# Patient Record
Sex: FEMALE | Race: WHITE | Employment: FULL TIME | URBAN - METROPOLITAN AREA
[De-identification: names, ages, dates, MRNs, and addresses within clinical notes are randomized per-mention and may not be internally consistent; named-entity substitution may affect disease eponyms.]

---

## 2017-05-22 ENCOUNTER — HOSPITAL ENCOUNTER (OUTPATIENT)
Dept: MAMMOGRAPHY | Age: 57
Discharge: HOME | End: 2017-05-22
Attending: OBSTETRICS & GYNECOLOGY
Payer: COMMERCIAL

## 2017-05-22 DIAGNOSIS — Z12.31 VISIT FOR SCREENING MAMMOGRAM: ICD-10-CM

## 2017-05-22 PROCEDURE — 77067 SCR MAMMO BI INCL CAD: CPT

## 2017-06-01 ENCOUNTER — HOSPITAL ENCOUNTER (OUTPATIENT)
Dept: MAMMOGRAPHY | Age: 57
Discharge: HOME | End: 2017-06-01
Attending: OBSTETRICS & GYNECOLOGY
Payer: COMMERCIAL

## 2017-06-01 DIAGNOSIS — R92.8 ABNORMAL SCREENING MAMMOGRAM: ICD-10-CM

## 2017-06-01 PROCEDURE — 76642 ULTRASOUND BREAST LIMITED: CPT

## 2017-06-01 PROCEDURE — 77065 DX MAMMO INCL CAD UNI: CPT

## 2017-06-06 ENCOUNTER — HOSPITAL ENCOUNTER (OUTPATIENT)
Dept: MAMMOGRAPHY | Age: 57
Discharge: HOME | End: 2017-06-06
Attending: OBSTETRICS & GYNECOLOGY
Payer: COMMERCIAL

## 2017-06-06 VITALS — HEART RATE: 94 BPM | SYSTOLIC BLOOD PRESSURE: 150 MMHG | TEMPERATURE: 97.9 F | DIASTOLIC BLOOD PRESSURE: 88 MMHG

## 2017-06-06 DIAGNOSIS — N63.10 BREAST MASS, RIGHT: ICD-10-CM

## 2017-06-06 DIAGNOSIS — N63.10 MASS OF RIGHT BREAST: ICD-10-CM

## 2017-06-06 PROCEDURE — 77065 DX MAMMO INCL CAD UNI: CPT

## 2017-06-06 PROCEDURE — 77030013267 US BX BREAST RT 1ST LESION W/CLIP AND SPECIMEN

## 2017-06-06 PROCEDURE — 74011000250 HC RX REV CODE- 250: Performed by: OBSTETRICS & GYNECOLOGY

## 2017-06-06 PROCEDURE — 88305 TISSUE EXAM BY PATHOLOGIST: CPT | Performed by: OBSTETRICS & GYNECOLOGY

## 2017-06-06 RX ORDER — LIDOCAINE HYDROCHLORIDE 10 MG/ML
6 INJECTION INFILTRATION; PERINEURAL
Status: COMPLETED | OUTPATIENT
Start: 2017-06-06 | End: 2017-06-06

## 2017-06-06 RX ADMIN — LIDOCAINE HYDROCHLORIDE 6 ML: 10 INJECTION, SOLUTION INFILTRATION; PERINEURAL at 11:29

## 2018-01-12 PROBLEM — F33.9 RECURRENT DEPRESSION (HCC): Status: ACTIVE | Noted: 2018-01-12

## 2018-07-11 ENCOUNTER — HOSPITAL ENCOUNTER (OUTPATIENT)
Dept: MAMMOGRAPHY | Age: 58
Discharge: HOME | End: 2018-07-11
Attending: OBSTETRICS & GYNECOLOGY
Payer: COMMERCIAL

## 2018-07-11 DIAGNOSIS — Z12.31 VISIT FOR SCREENING MAMMOGRAM: ICD-10-CM

## 2018-07-11 PROCEDURE — 77067 SCR MAMMO BI INCL CAD: CPT

## 2019-07-19 ENCOUNTER — HOSPITAL ENCOUNTER (OUTPATIENT)
Dept: MAMMOGRAPHY | Age: 59
Discharge: HOME | End: 2019-07-19
Attending: INTERNAL MEDICINE
Payer: COMMERCIAL

## 2019-07-19 ENCOUNTER — HOSPITAL ENCOUNTER (OUTPATIENT)
Dept: MAMMOGRAPHY | Age: 59
Discharge: HOME | End: 2019-07-19
Attending: OBSTETRICS & GYNECOLOGY
Payer: COMMERCIAL

## 2019-07-19 DIAGNOSIS — Z12.31 VISIT FOR SCREENING MAMMOGRAM: ICD-10-CM

## 2019-07-19 DIAGNOSIS — Z78.0 POSTMENOPAUSAL: ICD-10-CM

## 2019-07-19 PROCEDURE — 77080 DXA BONE DENSITY AXIAL: CPT

## 2019-07-19 PROCEDURE — 77067 SCR MAMMO BI INCL CAD: CPT

## 2020-07-20 ENCOUNTER — HOSPITAL ENCOUNTER (OUTPATIENT)
Dept: MAMMOGRAPHY | Age: 60
Discharge: HOME | End: 2020-07-20
Attending: OBSTETRICS & GYNECOLOGY

## 2020-07-20 DIAGNOSIS — Z12.31 VISIT FOR SCREENING MAMMOGRAM: ICD-10-CM

## 2020-07-20 NOTE — LETTER
[address]   [address] [facility] [address] [facility]  [place], [address]                   Date: 6/21/2021        Dear Ms. Alyx Miller good health is important to us; that is why we are sending you this friendly reminder. As always, our goal is to be your partner in life-long wellness. Our records indicate that you need to make an appointment for your yearly mammogram. The approximate due date of your exam is 7/21/2021. Please call [phone] or [phone] to schedule this examination at your earliest convenience. If you have had this study done elsewhere, please contact us so we may update our records. If you have recently scheduled your appointment, kindly disregard this reminder.       Sincerely,    [address]  for Riz[facility]

## 2020-07-27 NOTE — PROGRESS NOTES
Neg mammogram.  Dense breast tissue - could have a supplemental breast mri to help with this.  Can order if pt would like

## 2020-10-20 ENCOUNTER — HOSPITAL ENCOUNTER (OUTPATIENT)
Dept: GENERAL RADIOLOGY | Age: 60
Discharge: HOME | End: 2020-10-20
Attending: INTERNAL MEDICINE
Payer: COMMERCIAL

## 2020-10-20 DIAGNOSIS — M05.9 SEROPOSITIVE RHEUMATOID ARTHRITIS (HCC): ICD-10-CM

## 2020-10-20 PROCEDURE — 73130 X-RAY EXAM OF HAND: CPT

## 2020-10-20 PROCEDURE — 73630 X-RAY EXAM OF FOOT: CPT

## 2021-07-20 ENCOUNTER — HOSPITAL ENCOUNTER (OUTPATIENT)
Dept: MAMMOGRAPHY | Age: 61
Discharge: HOME | End: 2021-07-20
Attending: INTERNAL MEDICINE
Payer: COMMERCIAL

## 2021-07-20 DIAGNOSIS — M81.0 POSTMENOPAUSAL OSTEOPOROSIS: ICD-10-CM

## 2021-07-20 PROCEDURE — 77080 DXA BONE DENSITY AXIAL: CPT

## 2022-02-24 ENCOUNTER — TRANSCRIBE ORDER (OUTPATIENT)
Dept: SCHEDULING | Age: 62
End: 2022-02-24

## 2022-02-24 DIAGNOSIS — Z12.31 ENCOUNTER FOR SCREENING MAMMOGRAM FOR MALIGNANT NEOPLASM OF BREAST: Primary | ICD-10-CM

## 2022-03-18 PROBLEM — F33.9 RECURRENT DEPRESSION (HCC): Status: ACTIVE | Noted: 2018-01-12

## 2022-06-27 ENCOUNTER — OFFICE VISIT (OUTPATIENT)
Dept: RHEUMATOLOGY | Age: 62
End: 2022-06-27
Payer: COMMERCIAL

## 2022-06-27 VITALS
HEIGHT: 60 IN | BODY MASS INDEX: 27.21 KG/M2 | WEIGHT: 138.6 LBS | SYSTOLIC BLOOD PRESSURE: 118 MMHG | DIASTOLIC BLOOD PRESSURE: 72 MMHG | RESPIRATION RATE: 16 BRPM | HEART RATE: 88 BPM

## 2022-06-27 DIAGNOSIS — R74.01 ELEVATED ALT MEASUREMENT: ICD-10-CM

## 2022-06-27 DIAGNOSIS — M81.0 POSTMENOPAUSAL OSTEOPOROSIS: ICD-10-CM

## 2022-06-27 DIAGNOSIS — M05.9 SEROPOSITIVE RHEUMATOID ARTHRITIS (HCC): Primary | ICD-10-CM

## 2022-06-27 DIAGNOSIS — Z79.83 LONG TERM (CURRENT) USE OF BISPHOSPHONATES: ICD-10-CM

## 2022-06-27 DIAGNOSIS — Z79.899 LONG-TERM USE OF LEFLUNOMIDE THERAPY: ICD-10-CM

## 2022-06-27 PROCEDURE — 99214 OFFICE O/P EST MOD 30 MIN: CPT | Performed by: INTERNAL MEDICINE

## 2022-06-27 RX ORDER — LEFLUNOMIDE 20 MG/1
20 TABLET ORAL DAILY
Qty: 90 TABLET | Refills: 0 | Status: SHIPPED | OUTPATIENT
Start: 2022-06-27 | End: 2022-10-21 | Stop reason: SDUPTHER

## 2022-06-27 ASSESSMENT — PATIENT HEALTH QUESTIONNAIRE - PHQ9
SUM OF ALL RESPONSES TO PHQ QUESTIONS 1-9: 0
1. LITTLE INTEREST OR PLEASURE IN DOING THINGS: 0
SUM OF ALL RESPONSES TO PHQ QUESTIONS 1-9: 0
SUM OF ALL RESPONSES TO PHQ QUESTIONS 1-9: 0
2. FEELING DOWN, DEPRESSED OR HOPELESS: 0
SUM OF ALL RESPONSES TO PHQ QUESTIONS 1-9: 0
SUM OF ALL RESPONSES TO PHQ9 QUESTIONS 1 & 2: 0

## 2022-06-27 NOTE — PROGRESS NOTES
6/27/22      SUBJECTIVE:  Morenita Ramos is a 62 y.o. female that is here for follow-up of:     Seropositive -RF +CCP >250 Rheumatoid Arthritis -  Dx 2016   - Current therapies: enbrel weekly (started ~2017), arava 20 mg (started ~2017)   - TB/Hepatitis - Nov 2020  Failed therapies:   - MTX: hair loss   - HCQ    - SSZ : GI upset   - xeljanz: GI upset    osteoporosis- #1 reclast Sept 2021     Hysterectomy 1999  L shoulder: RC tear w/arthroscopy    Works at [employer]  ---  Last office visit: Feb 2022. pt was maintained on arava 20 mg. Dx with Covid Dec 2021 then developed secondary sinus infection requiring antibiotic. Completed 4 covid vaccines since last visit. Adherent to therapy    Am stiffness - 10-15 mins  No consistent joint pain. No joint swelling    Follows with hand surgery and might have L #4 DIP fused in the future: on hold for now though. Told she has severe OA in base of bilateral hands    R tennis elbow symptoms are resolved. Didn't go back to OT  Using vitamin D 5000U daily, calcium citrate. No planned dental procedures . Does not consume regular high calcium or vitamin D foods. No freq infections, N/V, stomach pain    The most recent, and if available, past bone density study details are as follows. Date L1-4 spine? BMD L1-4 spine T-score Femoral Neck BMD Femoral Neck T-score Lowest  T-score    7/2021        -2.5                                          REVIEW OF SYSTEMS:  A total of 10 systems (musculoskeletal system as stated in the HPI and the following 9 systems) were reviewed with patient today and were negative except as stated in the HPI and except for the following (depicted with an \"X\"):        \"X\" Constitutional  \"X\" HEENT /Mouth  \"X\" Cardiovascular and  Respiratory (2 systems)  \"X\" Gastrointestinal    Fever/chills   Hair loss   Shortness of breath   Upset stomach    Falls   Dry mouth   Coughing   Diarrhea / constipation    Wt loss   Mouth sores   Wheezing   Heartburn    Wt gain Ringing ears   Chest pain   Dark or bloody stools    Night sweats   Diff. swallowing  X None of above   Nausea or vomiting   X None of above  X None of above     X None of above                \"X\" Integumentary  \"X\" Neurological  \"X\" Genitourinary  \"X\" Psychiatric    Easy bruising   Numbness/ tingling   Female problems  x Depression    Rashes  x Weakness   Problems with urination  x Feeling anxious    Sun sensitivity   Headaches  X None of above   Problems sleeping   X None of above   None of above      None of above       The following portions of the patient's history were reviewed and updated as appropriate:   Current Outpatient Medications   Medication Sig Dispense Refill    leflunomide (ARAVA) 20 MG tablet Take 1 tablet by mouth daily 90 tablet 0    CALCIUM-MAGNESIUM-ZINC PO Take 1 tablet by mouth daily      acetaminophen (TYLENOL) 500 MG tablet Take 1,000 mg by mouth every 6 hours as needed      azelastine (ASTELIN) 0.1 % nasal spray USE 1 SPRAY IN BOTH NOSTRILS TWO TIMES A DAY      busPIRone (BUSPAR) 7.5 MG tablet 10 mg      cetirizine (ZYRTEC) 10 MG tablet Take by mouth      clonazePAM (KLONOPIN) 0.5 MG tablet Take 0.5 mg by mouth.  cyanocobalamin 1000 MCG tablet Take 2,500 mcg by mouth daily      ezetimibe (ZETIA) 10 MG tablet Zetia 10 mg tablet Take 1 tablet every day by oral route.  fexofenadine (ALLEGRA) 180 MG tablet Take 180 mg by mouth daily as needed      lisinopril (PRINIVIL;ZESTRIL) 5 MG tablet TAKE 1 TABLET BY MOUTH EVERY DAY      sertraline (ZOLOFT) 100 MG tablet Take 150 mg by mouth daily      zoledronic acid (RECLAST) 5 MG/100ML SOLN Infuse 5 mg intravenously once       No current facility-administered medications for this visit. PHYSICAL EXAM:  Vitals:    06/27/22 1418   BP: 118/72   Pulse: 88   Resp: 16         CONSTITUTIONAL:  The patient was in NAD.  ENT:  The OPC, MMM, lips/teeth/gums without abnormalities.   NECK:  No masses or thyromegaly  LYMPH NODES:  No cervical or axillary lymphadenopathy. CV:  RRR no r/m/g. Normal peripheral pulses  RESP:  CTA bilaterally. Normal respiratory effort. GI:  Abdomen soft, non tender, no hepatosplenomegaly  Skin:  No rashes, nodules, or other lesions. MUSCULOSKELETAL :  All joints including neck, back bilateral shoulders, elbows, wrists, MCP's, PIP's, DIP's, hips, knees, ankles, toes are within normal limits including normal gross examination, no synovitis, no tenderness, n'l ROM EXCEPT:   Heberden's nodules  R elbow - reduced extension  T: 1         CDAI  Date:  [facility] Pt-Global MD-Global Score:  6/2022  1 0 4.5  2  7.5 - arava  11/2021 9 0 5  4  18  8/2021  0 0 5.5  2  7.5  3/2021  0 0 4  3  7    Scoring:  Remission CDAI ? 2.8   Low Disease Activity CDAI > 2.8 and ? 10   Moderate Disease Activity CDAI > 10 and ? 22   High Disease Activity CDAI > 22   A CDAI reduction of 6.5 represents moderate improvement      ASSESSMENT AND PLAN:  Chester Davis is a 62 y.o. female that is here for evaluation of RA.  her problems include:    1. Seropositive rheumatoid arthritis ([address]  2. Cyclic citrullinated peptide (CCP) antibody positive  3. Admission for therapeutic drug monitoring  4. Elevated ALT-less than 2 times upper limit of normal  Currently better controlled since she has stopped working. continue monotherapy with leflunomide 20 mg. She will call if she develops change in joint pain, swelling, stiffness. She has an injection of Humira to initiate if needed. Osteoporosis  Vitamin D deficiency  - received reclast Sept 2021.   Plan for second dose Reclast September 2022.  -normal vitamin D Nov 2021  - cont vit D 5000U  -Continue calcium citrate 600 mg bid   - reviewed core exercises and fall precautions  -DEXA due July 2023    Labs early Aug 2022 - standing, vit D  Reclast due Sept 2022  Standing labs Nov 2022   OV Dec 2022      Sadie Cha MD  [facility]  [address] [place] [address] [address]  [phone]

## 2022-06-27 NOTE — Clinical Note
Can you get patient scheduled for next Reclast in September 2022 without an office visit?   She will have labs done in August.  Thank you

## 2022-08-15 ENCOUNTER — HOSPITAL ENCOUNTER (OUTPATIENT)
Dept: MAMMOGRAPHY | Age: 62
Discharge: HOME | End: 2022-08-18
Payer: COMMERCIAL

## 2022-08-15 DIAGNOSIS — Z12.31 SCREENING MAMMOGRAM FOR HIGH-RISK PATIENT: ICD-10-CM

## 2022-08-15 PROCEDURE — 77063 BREAST TOMOSYNTHESIS BI: CPT

## 2022-09-01 ENCOUNTER — TELEPHONE (OUTPATIENT)
Dept: RHEUMATOLOGY | Age: 62
End: 2022-09-01

## 2022-09-01 NOTE — TELEPHONE ENCOUNTER
PHONE CALL to patient. She called and cancelled her lab that was scheduled. She did just have labs on 8/19/22 with Dr. Reginald Gerber. LEFT MESSAGE on the  at Dr. Mike Myers office to get her labs. PA for Reclast approved online on [facility] 9/1/22- 9/1/23, auth # [id number]    Scheduled pt for Reclast 9/13/22.

## 2022-09-01 NOTE — TELEPHONE ENCOUNTER
----- Message from Todd Jiang MA sent at 6/27/2022  4:55 PM EDT -----  Regarding: Jhon Kay MD sent to Todd Jiang MA    Can you get patient scheduled for next Reclast in September 2022 without an office visit? Ace Sanders will have labs done in August. Earna Big you

## 2022-09-06 NOTE — TELEPHONE ENCOUNTER
Labs received from Dr. Thurston Police office.  Performed 8/25/22  Calcium= 9.6  Creatnine= 0.8  GFR= 74.5  Lab Results   Component Value Date/Time    VITD25 31.0 11/17/2021 10:49 AM      Reclast 9/13/22, last infusion was 9/10/21

## 2022-09-13 ENCOUNTER — NURSE ONLY (OUTPATIENT)
Dept: RHEUMATOLOGY | Age: 62
End: 2022-09-13
Payer: COMMERCIAL

## 2022-09-13 DIAGNOSIS — M81.0 POSTMENOPAUSAL OSTEOPOROSIS: Primary | ICD-10-CM

## 2022-09-13 PROCEDURE — 96365 THER/PROPH/DIAG IV INF INIT: CPT | Performed by: INTERNAL MEDICINE

## 2022-09-13 RX ORDER — ZOLEDRONIC ACID 5 MG/100ML
5 INJECTION, SOLUTION INTRAVENOUS ONCE
Status: COMPLETED | OUTPATIENT
Start: 2022-09-13 | End: 2022-09-13

## 2022-09-13 RX ADMIN — ZOLEDRONIC ACID 5 MG: 5 INJECTION, SOLUTION INTRAVENOUS at 14:50

## 2022-09-14 VITALS — TEMPERATURE: 97.3 F | HEART RATE: 90 BPM | SYSTOLIC BLOOD PRESSURE: 104 MMHG | DIASTOLIC BLOOD PRESSURE: 75 MMHG

## 2022-09-14 NOTE — PROGRESS NOTES
[address] [address], [address]  Office : [phone], Fax: [phone]       Reclast 5mg/100ml infused today over 30 minutes for safety. Infusion tolerated well without complications. Advised patient to continue taking Calcium and Vitamin D post infusion. Call with any problems or side effects. Infusion placed in left forearm    IV insertion time: 1445  Medication start time: 1450  Medication completion time: 5808    Pre-infusion/injection questionairre for osteoporosis    1. Have you had or are you planning any dental work? NO    2. Are you taking your calcium and vitamin D? YES    3. When was your last osteoporosis treatment? 9/10/21    4. Have you had any recent fractures?  NO

## 2022-10-20 DIAGNOSIS — M05.9 SEROPOSITIVE RHEUMATOID ARTHRITIS (HCC): ICD-10-CM

## 2022-10-20 NOTE — TELEPHONE ENCOUNTER
Dr. Montoya Mad patient. Patient is treated for rheumatoid arthritis. Requesting refill of [facility]. Last visit 6/27/22, most recent labs below. Copied from attached file in lab tab. CMP 8/25/22.   CBC 8/19/22

## 2022-10-21 RX ORDER — LEFLUNOMIDE 20 MG/1
20 TABLET ORAL DAILY
Qty: 90 TABLET | Refills: 0 | Status: SHIPPED | OUTPATIENT
Start: 2022-10-21

## 2022-11-07 ENCOUNTER — NURSE ONLY (OUTPATIENT)
Dept: RHEUMATOLOGY | Age: 62
End: 2022-11-07

## 2022-11-07 DIAGNOSIS — M05.9 SEROPOSITIVE RHEUMATOID ARTHRITIS (HCC): ICD-10-CM

## 2022-11-07 DIAGNOSIS — M81.0 POSTMENOPAUSAL OSTEOPOROSIS: ICD-10-CM

## 2022-11-07 LAB
25(OH)D3 SERPL-MCNC: 63.4 NG/ML (ref 30–100)
ALBUMIN SERPL-MCNC: 4.1 G/DL (ref 3.2–4.6)
ALBUMIN/GLOB SERPL: 1.4 (ref 0.4–1.6)
ALP SERPL-CCNC: 56 U/L (ref 50–136)
ALT SERPL-CCNC: 32 U/L (ref 12–65)
ANION GAP SERPL CALC-SCNC: 3 MMOL/L (ref 2–11)
AST SERPL-CCNC: 17 U/L (ref 15–37)
BASOPHILS # BLD: 0.1 K/UL (ref 0–0.2)
BASOPHILS NFR BLD: 1 % (ref 0–2)
BILIRUB SERPL-MCNC: 0.4 MG/DL (ref 0.2–1.1)
BUN SERPL-MCNC: 20 MG/DL (ref 8–23)
CALCIUM SERPL-MCNC: 9.5 MG/DL (ref 8.3–10.4)
CHLORIDE SERPL-SCNC: 106 MMOL/L (ref 101–110)
CO2 SERPL-SCNC: 28 MMOL/L (ref 21–32)
CREAT SERPL-MCNC: 0.8 MG/DL (ref 0.6–1)
CRP SERPL-MCNC: <0.3 MG/DL (ref 0–0.9)
DIFFERENTIAL METHOD BLD: NORMAL
EOSINOPHIL # BLD: 0.3 K/UL (ref 0–0.8)
EOSINOPHIL NFR BLD: 5 % (ref 0.5–7.8)
ERYTHROCYTE [DISTWIDTH] IN BLOOD BY AUTOMATED COUNT: 13.1 % (ref 11.9–14.6)
ERYTHROCYTE [SEDIMENTATION RATE] IN BLOOD: 13 MM/HR (ref 0–30)
GLOBULIN SER CALC-MCNC: 3 G/DL (ref 2.8–4.5)
GLUCOSE SERPL-MCNC: 103 MG/DL (ref 65–100)
HCT VFR BLD AUTO: 43.3 % (ref 35.8–46.3)
HGB BLD-MCNC: 13.8 G/DL (ref 11.7–15.4)
IMM GRANULOCYTES # BLD AUTO: 0 K/UL (ref 0–0.5)
IMM GRANULOCYTES NFR BLD AUTO: 0 % (ref 0–5)
LYMPHOCYTES # BLD: 1.7 K/UL (ref 0.5–4.6)
LYMPHOCYTES NFR BLD: 23 % (ref 13–44)
MCH RBC QN AUTO: 29.3 PG (ref 26.1–32.9)
MCHC RBC AUTO-ENTMCNC: 31.9 G/DL (ref 31.4–35)
MCV RBC AUTO: 91.9 FL (ref 82–102)
MONOCYTES # BLD: 0.7 K/UL (ref 0.1–1.3)
MONOCYTES NFR BLD: 9 % (ref 4–12)
NEUTS SEG # BLD: 4.5 K/UL (ref 1.7–8.2)
NEUTS SEG NFR BLD: 62 % (ref 43–78)
NRBC # BLD: 0 K/UL (ref 0–0.2)
PLATELET # BLD AUTO: 265 K/UL (ref 150–450)
PMV BLD AUTO: 10.2 FL (ref 9.4–12.3)
POTASSIUM SERPL-SCNC: 4.5 MMOL/L (ref 3.5–5.1)
PROT SERPL-MCNC: 7.1 G/DL (ref 6.3–8.2)
RBC # BLD AUTO: 4.71 M/UL (ref 4.05–5.2)
SODIUM SERPL-SCNC: 137 MMOL/L (ref 133–143)
WBC # BLD AUTO: 7.3 K/UL (ref 4.3–11.1)

## 2023-01-24 DIAGNOSIS — M05.9 SEROPOSITIVE RHEUMATOID ARTHRITIS (HCC): ICD-10-CM

## 2023-01-24 NOTE — TELEPHONE ENCOUNTER
Pt needs refill of arava prior to upcoming visit on 1/31/23  Labs done 11/7/22.     Pended refill below

## 2023-01-25 RX ORDER — LEFLUNOMIDE 20 MG/1
20 TABLET ORAL DAILY
Qty: 90 TABLET | Refills: 0 | Status: SHIPPED | OUTPATIENT
Start: 2023-01-25

## 2023-01-31 ENCOUNTER — OFFICE VISIT (OUTPATIENT)
Dept: RHEUMATOLOGY | Age: 63
End: 2023-01-31
Payer: COMMERCIAL

## 2023-01-31 VITALS
DIASTOLIC BLOOD PRESSURE: 78 MMHG | RESPIRATION RATE: 14 BRPM | HEART RATE: 112 BPM | HEIGHT: 60 IN | SYSTOLIC BLOOD PRESSURE: 132 MMHG | BODY MASS INDEX: 27.07 KG/M2

## 2023-01-31 DIAGNOSIS — M05.9 SEROPOSITIVE RHEUMATOID ARTHRITIS (HCC): ICD-10-CM

## 2023-01-31 LAB
ALBUMIN SERPL-MCNC: 4.3 G/DL (ref 3.2–4.6)
ALBUMIN/GLOB SERPL: 1.3 (ref 0.4–1.6)
ALP SERPL-CCNC: 60 U/L (ref 50–136)
ALT SERPL-CCNC: 30 U/L (ref 12–65)
ANION GAP SERPL CALC-SCNC: 11 MMOL/L (ref 2–11)
AST SERPL-CCNC: 21 U/L (ref 15–37)
BASOPHILS # BLD: 0 K/UL (ref 0–0.2)
BASOPHILS NFR BLD: 1 % (ref 0–2)
BILIRUB SERPL-MCNC: 0.6 MG/DL (ref 0.2–1.1)
BUN SERPL-MCNC: 19 MG/DL (ref 8–23)
CALCIUM SERPL-MCNC: 9.6 MG/DL (ref 8.3–10.4)
CHLORIDE SERPL-SCNC: 104 MMOL/L (ref 101–110)
CO2 SERPL-SCNC: 27 MMOL/L (ref 21–32)
CREAT SERPL-MCNC: 0.9 MG/DL (ref 0.6–1)
CRP SERPL-MCNC: <0.3 MG/DL (ref 0–0.9)
DIFFERENTIAL METHOD BLD: NORMAL
EOSINOPHIL # BLD: 0.3 K/UL (ref 0–0.8)
EOSINOPHIL NFR BLD: 4 % (ref 0.5–7.8)
ERYTHROCYTE [DISTWIDTH] IN BLOOD BY AUTOMATED COUNT: 13.2 % (ref 11.9–14.6)
ERYTHROCYTE [SEDIMENTATION RATE] IN BLOOD: 10 MM/HR (ref 0–30)
GLOBULIN SER CALC-MCNC: 3.2 G/DL (ref 2.8–4.5)
GLUCOSE SERPL-MCNC: 124 MG/DL (ref 65–100)
HCT VFR BLD AUTO: 45.1 % (ref 35.8–46.3)
HGB BLD-MCNC: 14.4 G/DL (ref 11.7–15.4)
IMM GRANULOCYTES # BLD AUTO: 0 K/UL (ref 0–0.5)
IMM GRANULOCYTES NFR BLD AUTO: 0 % (ref 0–5)
LYMPHOCYTES # BLD: 1.7 K/UL (ref 0.5–4.6)
LYMPHOCYTES NFR BLD: 21 % (ref 13–44)
MCH RBC QN AUTO: 29.7 PG (ref 26.1–32.9)
MCHC RBC AUTO-ENTMCNC: 31.9 G/DL (ref 31.4–35)
MCV RBC AUTO: 93 FL (ref 82–102)
MONOCYTES # BLD: 0.4 K/UL (ref 0.1–1.3)
MONOCYTES NFR BLD: 5 % (ref 4–12)
NEUTS SEG # BLD: 5.7 K/UL (ref 1.7–8.2)
NEUTS SEG NFR BLD: 69 % (ref 43–78)
NRBC # BLD: 0 K/UL (ref 0–0.2)
PLATELET # BLD AUTO: 289 K/UL (ref 150–450)
PMV BLD AUTO: 9.7 FL (ref 9.4–12.3)
POTASSIUM SERPL-SCNC: 4.2 MMOL/L (ref 3.5–5.1)
PROT SERPL-MCNC: 7.5 G/DL (ref 6.3–8.2)
RBC # BLD AUTO: 4.85 M/UL (ref 4.05–5.2)
SODIUM SERPL-SCNC: 142 MMOL/L (ref 133–143)
WBC # BLD AUTO: 8.3 K/UL (ref 4.3–11.1)

## 2023-01-31 PROCEDURE — 99214 OFFICE O/P EST MOD 30 MIN: CPT | Performed by: INTERNAL MEDICINE

## 2023-01-31 RX ORDER — LEFLUNOMIDE 20 MG/1
20 TABLET ORAL DAILY
Qty: 90 TABLET | Refills: 0 | Status: SHIPPED | OUTPATIENT
Start: 2023-01-31

## 2023-01-31 ASSESSMENT — ROUTINE ASSESSMENT OF PATIENT INDEX DATA (RAPID3)
ON A SCALE OF ONE TO TEN, HOW DIFFICULT WAS IT FOR YOU TO COMPLETE THE LISTED DAILY PHYSICAL TASKS OVER THE LAST WEEK: 0.2
WHEN YOU AWAKENED IN THE MORNING OVER THE LAST WEEK, PLEASE INDICATE THE AMOUNT OF TIME IT TAKES UNTIL YOU ARE AS LIMBER AS YOU WILL BE FOR THE DAY: < 10 MIN
ON A SCALE OF ONE TO TEN, CONSIDERING ALL THE WAYS IN WHICH ILLNESS AND HEALTH CONDITIONS MAY AFFECT YOU AT THIS TIME, PLEASE INDICATE BELOW HOW YOU ARE DOING:: 5
ON A SCALE OF ONE TO TEN, HOW MUCH PAIN HAVE YOU HAD BECAUSE OF YOUR CONDITION OVER THE PAST WEEK?: 5
ON A SCALE OF ONE TO TEN, HOW MUCH OF A PROBLEM HAS UNUSUAL FATIGUE OR TIREDNESS BEEN FOR YOU OVER THE PAST WEEK?: 4

## 2023-01-31 NOTE — PROGRESS NOTES
1/31/23      SUBJECTIVE:  Kylie Fernández is a 62 y.o. female that is here for follow-up of:     Seropositive -RF +CCP >250 Rheumatoid Arthritis -  Dx 2016   - Current therapies: enbrel weekly (started ~2017), arava 20 mg (started ~2017)   - TB/Hepatitis - Nov 2020  Failed therapies:   - MTX: hair loss   - HCQ - retinal toxicity   - SSZ : GI upset   - xeljanz: GI upset    osteoporosis- #1 reclast Sept 2021     Hysterectomy 1999  L shoulder: RC tear w/arthroscopy    Prev worked  at [employer]  ---  Last office visit: June 2022. pt was maintained on arava 20 mg. Adherent to therapy- felt this was under control. Labs reviewed 11/7/22-CBC with differential, CMP, ESR, CRP-normal. Dx with Covid Dec 2021 then developed secondary sinus infection requiring antibiotic. Planned initiation of humira- she briefly used then held related to    Completed new covid booster, flu shot  Adherent to therapy    Am stiffness - 10-15 mins  Lately- experiencing new hand pain x 1 mo - midday in PIPs - increasing towards end of the day. Swelling in L #2 PIP, L #2 MCP - episodic. No joint swelling    Follows with hand surgery and might have L #4 DIP fused in the future: on hold for now though. Told she has severe OA in base of bilateral hands      Using vitamin D 5000U daily, calcium citrate. No planned dental procedures . Does not consume regular high calcium or vitamin D foods. No freq infections, N/V, stomach pain    The most recent, and if available, past bone density study details are as follows. Date L1-4 spine? BMD L1-4 spine T-score Femoral Neck BMD Femoral Neck T-score Lowest  T-score    7/2021        -2.5                                      DMARD/Biologic 1/31/2023   AM Stiffness < 10 min   Pain 5   Fatigue 4   MDHAQ 0.2   Patient Global Score 5   Medication Name Arava   Dose 20mg QD           REVIEW OF SYSTEMS:  A total of 10 systems (musculoskeletal system as stated in the HPI and the following 9 systems) were reviewed with patient today and were negative except as stated in the HPI and except for the following (depicted with an \"X\"):        \"X\" Constitutional  \"X\" HEENT /Mouth  \"X\" Cardiovascular and  Respiratory (2 systems)  \"X\" Gastrointestinal    Fever/chills   Hair loss   Shortness of breath  x Upset stomach    Falls   Dry mouth   Coughing  x Diarrhea / constipation    Wt loss   Mouth sores   Wheezing   Heartburn    Wt gain   Ringing ears   Chest pain   Dark or bloody stools    Night sweats   Diff. swallowing  X None of above   Nausea or vomiting   X None of above  X None of above      None of above                \"X\" Integumentary  \"X\" Neurological  \"X\" Genitourinary  \"X\" Psychiatric    Easy bruising   Numbness/ tingling   Female problems   Depression    Rashes   Weakness   Problems with urination  x Feeling anxious    Sun sensitivity  x Headaches  X None of above   Problems sleeping   X None of above   None of above      None of above       The following portions of the patient's history were reviewed and updated as appropriate:   Current Outpatient Medications   Medication Sig Dispense Refill    leflunomide (ARAVA) 20 MG tablet Take 1 tablet by mouth daily 90 tablet 0    CALCIUM-MAGNESIUM-ZINC PO Take 1 tablet by mouth daily      acetaminophen (TYLENOL) 500 MG tablet Take 1,000 mg by mouth every 6 hours as needed      azelastine (ASTELIN) 0.1 % nasal spray USE 1 SPRAY IN BOTH NOSTRILS TWO TIMES A DAY      busPIRone (BUSPAR) 7.5 MG tablet 10 mg      cetirizine (ZYRTEC) 10 MG tablet Take by mouth      clonazePAM (KLONOPIN) 0.5 MG tablet Take 0.5 mg by mouth nightly as needed. cyanocobalamin 1000 MCG tablet Take 2,500 mcg by mouth daily      ezetimibe (ZETIA) 10 MG tablet Zetia 10 mg tablet Take 1 tablet every day by oral route.       fexofenadine (ALLEGRA) 180 MG tablet Take 180 mg by mouth daily as needed      lisinopril (PRINIVIL;ZESTRIL) 5 MG tablet TAKE 1 TABLET BY MOUTH EVERY DAY      sertraline (ZOLOFT) 100 MG tablet Take 150 mg by mouth daily      zoledronic acid (RECLAST) 5 MG/100ML SOLN Infuse 5 mg intravenously once       No current facility-administered medications for this visit. PHYSICAL EXAM:  Vitals:    01/31/23 1241   BP: 132/78   Pulse: (!) 112   Resp: 14         CONSTITUTIONAL:  The patient was in NAD.  ENT:  The OPC, MMM, lips/teeth/gums without abnormalities. NECK:  No masses or thyromegaly  LYMPH NODES:  No cervical or axillary lymphadenopathy. CV:  RRR no r/m/g. Normal peripheral pulses  RESP:  CTA bilaterally. Normal respiratory effort. GI:  Abdomen soft, non tender, no hepatosplenomegaly  Skin:  No rashes, nodules, or other lesions. MUSCULOSKELETAL :  All joints including neck, back bilateral shoulders, elbows, wrists, MCP's, PIP's, DIP's, hips, knees, ankles, toes are within normal limits including normal gross examination, no synovitis, no tenderness, n'l ROM EXCEPT:   Heberden's nodules  R elbow - reduced extension  T: 11        CDAI  Date:  [facility] Pt-Global MD-Global Score:  1/2023  11 0 5  5  21  6/2022  1 0 4.5  2  7.5 - arava  11/2021 9 0 5  4  18  8/2021  0 0 5.5  2  7.5  3/2021  0 0 4  3  7    Scoring:  Remission CDAI ? 2.8   Low Disease Activity CDAI > 2.8 and ? 10   Moderate Disease Activity CDAI > 10 and ? 22   High Disease Activity CDAI > 22   A CDAI reduction of 6.5 represents moderate improvement      ASSESSMENT AND PLAN:  Anyi Parikh is a 62 y.o. female that is here for evaluation of RA.  her problems include:    1. Seropositive rheumatoid arthritis ([address]  2. Cyclic citrullinated peptide (CCP) antibody positive  3.  Admission for therapeutic drug monitoring    Poor control at this time-moderate disease activity on leflunomide 20 Mg.  - Check ESR, CRP, Vectra-if elevated will discuss Humira initiation (patient has 1 injection at home)  - If labs are normal, patient will contact me should symptoms worsen for which we would initiate Humira  Discussed the medication Humira dosage, usage, goals of therapy, and potential side effects. Discussed potential side effects including but not limited to increased prevalence/incidence of neoplasms, lymphoma, neurologic disease, infection risk including the possibility of life threatening infections, and exacerbation of CHF with anti-TNF use. The patient understood and wishes to proceed with the treatment.           Osteoporosis  Vitamin D deficiency  - received reclast Sept 2021, sept 2022 (#2).    -normal vitamin D Nov 2021  - cont vit D 5000U  -Continue calcium citrate 600 mg bid   - reviewed core exercises and fall precautions  -DEXA due July 2023    Labs today: Standing, Vectra  Follow-up in 3 months with standing labs    Claudette Ocampo MD  [facility]  [address] [place] [address] [address]  [phone]

## 2023-02-07 LAB
BIOMARKER COMMENT: NORMAL
CRP RESULT: 0.51 MG/L
EGF RESULT: 82 PG/ML
FOOTNOTE/HISTORY: NORMAL
IL-6 RESULT: 16 PG/ML
LEPTIN RESULT: 73 NG/ML
Lab: NORMAL
Lab: NORMAL
MMP-1 RESULT: 3.8 NG/ML
MMP3 RESULT: 18 NG/ML
RESISTIN RESULT: 4.1 NG/ML
RISK OF RADIOGRAPHIC PROGRESS: 3 %
SAA RESULT: 1.1 UG/ML
TNF-RI RESULT: 1.2 NG/ML
VCAM-1 RESULT: 0.89 UG/ML
VECTRA SCORE: 28
VECTRA(R) LEVEL: NORMAL
VEGF-A RESULT: 400 PG/ML
YKL-40 RESULT: 30 NG/ML

## 2023-03-23 ENCOUNTER — TELEPHONE (OUTPATIENT)
Dept: RHEUMATOLOGY | Age: 63
End: 2023-03-23

## 2023-03-23 NOTE — TELEPHONE ENCOUNTER
Pt lvm that she needed a refill on arava and the pharmacy told her she did not have one. Elijah Lawler was sent on 1/31/23 and picked up as a 90 day supply [facility]. Call to patient and got vm, left message for pt that she should have sufficient medication and would not have a refill past 90 day supply until labs were done and she would also have her follow up visit as well. Requested patient call back if she had any questions.

## 2023-04-24 DIAGNOSIS — M05.9 SEROPOSITIVE RHEUMATOID ARTHRITIS (HCC): ICD-10-CM

## 2023-04-24 LAB
BASOPHILS # BLD: 0.1 K/UL (ref 0–0.2)
BASOPHILS NFR BLD: 1 % (ref 0–2)
DIFFERENTIAL METHOD BLD: NORMAL
EOSINOPHIL # BLD: 0.2 K/UL (ref 0–0.8)
EOSINOPHIL NFR BLD: 3 % (ref 0.5–7.8)
ERYTHROCYTE [DISTWIDTH] IN BLOOD BY AUTOMATED COUNT: 13.3 % (ref 11.9–14.6)
ERYTHROCYTE [SEDIMENTATION RATE] IN BLOOD: 1 MM/HR (ref 0–30)
HCT VFR BLD AUTO: 43.5 % (ref 35.8–46.3)
HGB BLD-MCNC: 13.7 G/DL (ref 11.7–15.4)
IMM GRANULOCYTES # BLD AUTO: 0 K/UL (ref 0–0.5)
IMM GRANULOCYTES NFR BLD AUTO: 0 % (ref 0–5)
LYMPHOCYTES # BLD: 1.2 K/UL (ref 0.5–4.6)
LYMPHOCYTES NFR BLD: 22 % (ref 13–44)
MCH RBC QN AUTO: 29.2 PG (ref 26.1–32.9)
MCHC RBC AUTO-ENTMCNC: 31.5 G/DL (ref 31.4–35)
MCV RBC AUTO: 92.8 FL (ref 82–102)
MONOCYTES # BLD: 0.4 K/UL (ref 0.1–1.3)
MONOCYTES NFR BLD: 7 % (ref 4–12)
NEUTS SEG # BLD: 3.7 K/UL (ref 1.7–8.2)
NEUTS SEG NFR BLD: 67 % (ref 43–78)
NRBC # BLD: 0 K/UL (ref 0–0.2)
PLATELET # BLD AUTO: 251 K/UL (ref 150–450)
PMV BLD AUTO: 10.1 FL (ref 9.4–12.3)
RBC # BLD AUTO: 4.69 M/UL (ref 4.05–5.2)
WBC # BLD AUTO: 5.5 K/UL (ref 4.3–11.1)

## 2023-04-25 LAB
ALBUMIN SERPL-MCNC: 4.2 G/DL (ref 3.2–4.6)
ALBUMIN/GLOB SERPL: 1.7 (ref 0.4–1.6)
ALP SERPL-CCNC: 63 U/L (ref 50–136)
ALT SERPL-CCNC: 32 U/L (ref 12–65)
ANION GAP SERPL CALC-SCNC: 4 MMOL/L (ref 2–11)
AST SERPL-CCNC: 20 U/L (ref 15–37)
BILIRUB SERPL-MCNC: 0.5 MG/DL (ref 0.2–1.1)
BUN SERPL-MCNC: 16 MG/DL (ref 8–23)
CALCIUM SERPL-MCNC: 9.4 MG/DL (ref 8.3–10.4)
CHLORIDE SERPL-SCNC: 106 MMOL/L (ref 101–110)
CO2 SERPL-SCNC: 27 MMOL/L (ref 21–32)
CREAT SERPL-MCNC: 0.7 MG/DL (ref 0.6–1)
CRP SERPL-MCNC: <0.3 MG/DL (ref 0–0.9)
GLOBULIN SER CALC-MCNC: 2.5 G/DL (ref 2.8–4.5)
GLUCOSE SERPL-MCNC: 128 MG/DL (ref 65–100)
POTASSIUM SERPL-SCNC: 4.4 MMOL/L (ref 3.5–5.1)
PROT SERPL-MCNC: 6.7 G/DL (ref 6.3–8.2)
SODIUM SERPL-SCNC: 137 MMOL/L (ref 133–143)

## 2023-05-08 ENCOUNTER — OFFICE VISIT (OUTPATIENT)
Dept: RHEUMATOLOGY | Age: 63
End: 2023-05-08
Payer: COMMERCIAL

## 2023-05-08 VITALS
BODY MASS INDEX: 25.91 KG/M2 | WEIGHT: 132 LBS | HEART RATE: 100 BPM | RESPIRATION RATE: 14 BRPM | DIASTOLIC BLOOD PRESSURE: 78 MMHG | SYSTOLIC BLOOD PRESSURE: 128 MMHG | HEIGHT: 60 IN

## 2023-05-08 DIAGNOSIS — M81.0 POSTMENOPAUSAL OSTEOPOROSIS: Primary | ICD-10-CM

## 2023-05-08 DIAGNOSIS — M05.9 SEROPOSITIVE RHEUMATOID ARTHRITIS (HCC): ICD-10-CM

## 2023-05-08 PROCEDURE — 99214 OFFICE O/P EST MOD 30 MIN: CPT | Performed by: INTERNAL MEDICINE

## 2023-05-08 RX ORDER — ATORVASTATIN CALCIUM 40 MG/1
40 TABLET, FILM COATED ORAL DAILY
COMMUNITY

## 2023-05-08 RX ORDER — LEFLUNOMIDE 20 MG/1
20 TABLET ORAL DAILY
Qty: 90 TABLET | Refills: 0 | Status: SHIPPED | OUTPATIENT
Start: 2023-05-08

## 2023-05-08 ASSESSMENT — ROUTINE ASSESSMENT OF PATIENT INDEX DATA (RAPID3)
ON A SCALE OF ONE TO TEN, HOW MUCH PAIN HAVE YOU HAD BECAUSE OF YOUR CONDITION OVER THE PAST WEEK?: 4
ON A SCALE OF ONE TO TEN, HOW MUCH OF A PROBLEM HAS UNUSUAL FATIGUE OR TIREDNESS BEEN FOR YOU OVER THE PAST WEEK?: 6
ON A SCALE OF ONE TO TEN, CONSIDERING ALL THE WAYS IN WHICH ILLNESS AND HEALTH CONDITIONS MAY AFFECT YOU AT THIS TIME, PLEASE INDICATE BELOW HOW YOU ARE DOING:: 4
ON A SCALE OF ONE TO TEN, HOW DIFFICULT WAS IT FOR YOU TO COMPLETE THE LISTED DAILY PHYSICAL TASKS OVER THE LAST WEEK: 0.1
WHEN YOU AWAKENED IN THE MORNING OVER THE LAST WEEK, PLEASE INDICATE THE AMOUNT OF TIME IT TAKES UNTIL YOU ARE AS LIMBER AS YOU WILL BE FOR THE DAY: < 10 MIN

## 2023-05-08 NOTE — PROGRESS NOTES
5/8/23      SUBJECTIVE:  Noemi Reyes is a 62 y.o. female that is here for follow-up of:     Seropositive -RF +CCP >250 Rheumatoid Arthritis -  Dx 2016   - Current therapies: , arava 20 mg (started ~2017)   - TB/Hepatitis - Nov 2020   - enbrel   - humira  Failed therapies:   - MTX: hair loss   - HCQ - retinal toxicity   - SSZ : GI upset   - xeljanz: GI upset    osteoporosis- #1 reclast Sept 2021. 2022     Hysterectomy 1999  L shoulder: RC tear w/arthroscopy    Prev worked  at [employer] - retired  ---  Last office visit: Jan 2023. pt was maintained on arava 20 mg. Feels better today than last visit. Adherent to therapy. Father is in home hospice so she has been staying with her parents. She doesn't eat regular meals or sleep. Experienced significant epigastric pain when he first got sick that is better now - she was very stressed. Required tagamet - didn't want to eat at that time. Has lost about 3 pounds    Labs reviewed 4/24/23-CBC with differential, CMP, ESR, CRP      Completed new covid booster, flu shot  Adherent to therapy    Am stiffness - 10 mins  No consistent joint pain or swelling      Follows with hand surgery and might have L #4 DIP fused in the future: on hold for now though. Told she has severe OA in base of bilateral hands      Using vitamin D 5000U daily, calcium citrate. No planned dental procedures . Does not consume regular high calcium or vitamin D foods. No freq infections, N/V, stomach pain    The most recent, and if available, past bone density study details are as follows. Date L1-4 spine? BMD L1-4 spine T-score Femoral Neck BMD Femoral Neck T-score Lowest  T-score    7/2021        -2.5                                      DMARD/Biologic 5/8/2023   AM Stiffness < 10 min   Pain 4   Fatigue 6   MDHAQ 0.1   Patient Global Score 4   Medication Name Arava   Dose 20mg           REVIEW OF SYSTEMS:  A total of 10 systems (musculoskeletal system as stated in the HPI and the following 9 systems)

## 2023-05-08 NOTE — PATIENT INSTRUCTIONS
- about 6 months after last omicron booster you can get 2nd dose.      - prevnar 20 - recommended pneumonia vaccine     to scheduled bone density    Return in 3 months with LABS ONE WEEK PRIOR TO APPT

## 2023-07-27 ENCOUNTER — HOSPITAL ENCOUNTER (OUTPATIENT)
Dept: MAMMOGRAPHY | Age: 63
Discharge: HOME | End: 2023-07-27
Attending: INTERNAL MEDICINE
Payer: COMMERCIAL

## 2023-07-27 DIAGNOSIS — M81.0 POSTMENOPAUSAL OSTEOPOROSIS: ICD-10-CM

## 2023-07-27 DIAGNOSIS — M05.9 SEROPOSITIVE RHEUMATOID ARTHRITIS (HCC): ICD-10-CM

## 2023-07-27 LAB
BASOPHILS # BLD: 0.1 K/UL (ref 0–0.2)
BASOPHILS NFR BLD: 1 % (ref 0–2)
DIFFERENTIAL METHOD BLD: NORMAL
EOSINOPHIL # BLD: 0.3 K/UL (ref 0–0.8)
EOSINOPHIL NFR BLD: 6 % (ref 0.5–7.8)
ERYTHROCYTE [DISTWIDTH] IN BLOOD BY AUTOMATED COUNT: 13.2 % (ref 11.9–14.6)
ERYTHROCYTE [SEDIMENTATION RATE] IN BLOOD: 17 MM/HR (ref 0–30)
HCT VFR BLD AUTO: 43.4 % (ref 35.8–46.3)
HGB BLD-MCNC: 13.9 G/DL (ref 11.7–15.4)
IMM GRANULOCYTES # BLD AUTO: 0 K/UL (ref 0–0.5)
IMM GRANULOCYTES NFR BLD AUTO: 0 % (ref 0–5)
LYMPHOCYTES # BLD: 1.4 K/UL (ref 0.5–4.6)
LYMPHOCYTES NFR BLD: 30 % (ref 13–44)
MCH RBC QN AUTO: 29.4 PG (ref 26.1–32.9)
MCHC RBC AUTO-ENTMCNC: 32 G/DL (ref 31.4–35)
MCV RBC AUTO: 91.8 FL (ref 82–102)
MONOCYTES # BLD: 0.4 K/UL (ref 0.1–1.3)
MONOCYTES NFR BLD: 10 % (ref 4–12)
NEUTS SEG # BLD: 2.5 K/UL (ref 1.7–8.2)
NEUTS SEG NFR BLD: 53 % (ref 43–78)
NRBC # BLD: 0 K/UL (ref 0–0.2)
PLATELET # BLD AUTO: 276 K/UL (ref 150–450)
PMV BLD AUTO: 10 FL (ref 9.4–12.3)
RBC # BLD AUTO: 4.73 M/UL (ref 4.05–5.2)
WBC # BLD AUTO: 4.7 K/UL (ref 4.3–11.1)

## 2023-07-27 PROCEDURE — 77080 DXA BONE DENSITY AXIAL: CPT

## 2023-07-28 LAB
ALBUMIN SERPL-MCNC: 4.1 G/DL (ref 3.2–4.6)
ALBUMIN/GLOB SERPL: 1.5 (ref 0.4–1.6)
ALP SERPL-CCNC: 66 U/L (ref 50–136)
ALT SERPL-CCNC: 32 U/L (ref 12–65)
ANION GAP SERPL CALC-SCNC: 10 MMOL/L (ref 2–11)
AST SERPL-CCNC: 20 U/L (ref 15–37)
BILIRUB SERPL-MCNC: 0.4 MG/DL (ref 0.2–1.1)
BUN SERPL-MCNC: 18 MG/DL (ref 8–23)
CALCIUM SERPL-MCNC: 9 MG/DL (ref 8.3–10.4)
CHLORIDE SERPL-SCNC: 109 MMOL/L (ref 101–110)
CO2 SERPL-SCNC: 24 MMOL/L (ref 21–32)
CREAT SERPL-MCNC: 0.8 MG/DL (ref 0.6–1)
CRP SERPL-MCNC: <0.3 MG/DL (ref 0–0.9)
GLOBULIN SER CALC-MCNC: 2.8 G/DL (ref 2.8–4.5)
GLUCOSE SERPL-MCNC: 92 MG/DL (ref 65–100)
POTASSIUM SERPL-SCNC: 4.2 MMOL/L (ref 3.5–5.1)
PROT SERPL-MCNC: 6.9 G/DL (ref 6.3–8.2)
SODIUM SERPL-SCNC: 143 MMOL/L (ref 133–143)

## 2023-08-08 ENCOUNTER — OFFICE VISIT (OUTPATIENT)
Dept: RHEUMATOLOGY | Age: 63
End: 2023-08-08
Payer: COMMERCIAL

## 2023-08-08 VITALS
BODY MASS INDEX: 26.9 KG/M2 | WEIGHT: 137 LBS | RESPIRATION RATE: 16 BRPM | DIASTOLIC BLOOD PRESSURE: 92 MMHG | HEART RATE: 96 BPM | SYSTOLIC BLOOD PRESSURE: 128 MMHG | HEIGHT: 60 IN

## 2023-08-08 DIAGNOSIS — M81.0 POSTMENOPAUSAL OSTEOPOROSIS: ICD-10-CM

## 2023-08-08 DIAGNOSIS — M05.9 SEROPOSITIVE RHEUMATOID ARTHRITIS (HCC): Primary | ICD-10-CM

## 2023-08-08 DIAGNOSIS — Z79.899 LONG-TERM USE OF LEFLUNOMIDE THERAPY: ICD-10-CM

## 2023-08-08 DIAGNOSIS — M15.4 EROSIVE OSTEOARTHRITIS OF BOTH HANDS: ICD-10-CM

## 2023-08-08 DIAGNOSIS — Z79.83 LONG TERM (CURRENT) USE OF BISPHOSPHONATES: ICD-10-CM

## 2023-08-08 PROCEDURE — 99214 OFFICE O/P EST MOD 30 MIN: CPT | Performed by: INTERNAL MEDICINE

## 2023-08-08 RX ORDER — DICLOFENAC SODIUM 30 MG/G
1 GEL TOPICAL 2 TIMES DAILY PRN
Qty: 100 G | Refills: 1 | Status: SHIPPED | OUTPATIENT
Start: 2023-08-08

## 2023-08-08 RX ORDER — LEFLUNOMIDE 20 MG/1
20 TABLET ORAL DAILY
Qty: 90 TABLET | Refills: 0 | Status: SHIPPED | OUTPATIENT
Start: 2023-08-08

## 2023-08-08 ASSESSMENT — ROUTINE ASSESSMENT OF PATIENT INDEX DATA (RAPID3)
ON A SCALE OF ONE TO TEN, HOW DIFFICULT WAS IT FOR YOU TO COMPLETE THE LISTED DAILY PHYSICAL TASKS OVER THE LAST WEEK: 0.1
ON A SCALE OF ONE TO TEN, HOW MUCH OF A PROBLEM HAS UNUSUAL FATIGUE OR TIREDNESS BEEN FOR YOU OVER THE PAST WEEK?: 5
ON A SCALE OF ONE TO TEN, HOW MUCH PAIN HAVE YOU HAD BECAUSE OF YOUR CONDITION OVER THE PAST WEEK?: 5
WHEN YOU AWAKENED IN THE MORNING OVER THE LAST WEEK, PLEASE INDICATE THE AMOUNT OF TIME IT TAKES UNTIL YOU ARE AS LIMBER AS YOU WILL BE FOR THE DAY: < 10 MIN
ON A SCALE OF ONE TO TEN, CONSIDERING ALL THE WAYS IN WHICH ILLNESS AND HEALTH CONDITIONS MAY AFFECT YOU AT THIS TIME, PLEASE INDICATE BELOW HOW YOU ARE DOING:: 4

## 2023-08-08 NOTE — PROGRESS NOTES
8/8/23      SUBJECTIVE:  Brandi Uribe is a 62 y.o. female that is here for follow-up of:     Seropositive -RF +CCP >250 Rheumatoid Arthritis -  Dx 2016   - Current therapies: , arava 20 mg (started ~2017)   - TB/Hepatitis - Nov 2020   - enbrel   - humira  Failed therapies:   - MTX: hair loss   - HCQ - retinal toxicity   - SSZ : GI upset   - xeljanz: GI upset    osteoporosis- #1 reclast Sept 2021. 2022     Hysterectomy 1999  L shoulder: RC tear w/arthroscopy    Prev worked  at [employer] - retired  ---  Last office visit: May 2023. pt was maintained on arava 20 mg. Father passed since last visit. She is not staying with her mom now - nephew is. Birth of grandson yesterday Henna Smith and she is excited to meet him after our appt. Feels better today than last visit. Adherent to therapy. Father is in home hospice so she has been staying with her parents. She doesn't eat regular meals or sleep. Experienced significant epigastric pain when he first got sick that is better now - she was very stressed. Required tagamet - didn't want to eat at that time. Has lost about 3 pounds    Labs reviewed 4/24/23-CBC with differential, CMP, ESR, CRP      Completed new covid booster, flu shot  Adherent to therapy    Am stiffness - < 10 mins  No consistent joint pain or swelling outside of more pain in DIPs -she is using hands more. Follows with hand surgery and might have L #4 DIP fused in the future: on hold for now though. Told she has severe OA in base of bilateral hands      Using vitamin D 5000U daily, calcium citrate. No planned dental procedures. Consuming at least 1 serving of non-dairy milk on daily basis. Does not consume regular high calcium or vitamin D foods. No falls or fractures. No freq infections, N/V, stomach pain    The most recent, and if available, past bone density study details are as follows. Date L1-4 spine? BMD L1-4 spine T-score Femoral Neck BMD Femoral Neck T-score Lowest  T-score           -2.5

## 2023-08-08 NOTE — PATIENT INSTRUCTIONS
Return in 3 months  Do labs one week prior to visit      Paraffin wax  Diclofenac gel 1% four times daily  Over the counter options - lidocaine capsaicin menthol    Fish oil  Turmeric - hold until seeing pcp for heartburn  High fiber diet

## 2023-08-10 ENCOUNTER — TELEPHONE (OUTPATIENT)
Dept: RHEUMATOLOGY | Age: 63
End: 2023-08-10

## 2023-08-10 DIAGNOSIS — M15.4 EROSIVE OSTEOARTHRITIS OF BOTH HANDS: Primary | ICD-10-CM

## 2023-08-28 RX ORDER — DICLOFENAC SODIUM 20 MG/G
SOLUTION TOPICAL
Qty: 112 G | Refills: 2 | Status: SHIPPED | OUTPATIENT
Start: 2023-08-28

## 2023-09-06 NOTE — TELEPHONE ENCOUNTER
Additional information requested for PA for Diclofenac 2%. Faxed form with last OV and OV from Dr. Dario Cobb showing meds tried and failed.

## 2023-09-06 NOTE — TELEPHONE ENCOUNTER
Diclofenac 2% has been denied- she has tried and failed the alternatives, so the denial must be because the FDA approved use is for OA of the knees   (Nonprescription (over-the-counter) diclofenac topical gel (Voltaren Arthritis Pain) is used to relieve pain from arthritis in certain joints such as those of the knees, ankles, feet, elbows, wrists, and hands. Prescription diclofenac topical solution (Pennsaid) is used to relieve osteoarthritis pain in the knees. Message on [facility]:  Denied today  Request Reference Number: [id number]. DICLOFENAC SOL 2% is denied for not meeting the prior authorization requirement(s). Details of this decision are in the notice attached below or have been faxed to you. The requested medication is not covered because it is not on the listing or formulary of approved  drugs for your plan benefit. Please discuss alternative drug therapy with your provider. Per your health plan's criteria, this drug is covered if you meet the following:  (1) Both of the following:  (A) Your doctor provides medical records (for example: chart notes) showing the drug is being used for a Food and Drug Administration (FDA)-approved indication. (B) Additional requirements listed have been met (requirements in the Indications and Usage sections of the prescribing information or package insert, for example: first line therapies have been tried and failed, required testing have been met). The information provided does not show that you meet the criteria listed above. Please speak with your doctor about your choices.

## 2023-09-15 ENCOUNTER — TELEPHONE (OUTPATIENT)
Dept: RHEUMATOLOGY | Age: 63
End: 2023-09-15

## 2023-09-15 NOTE — TELEPHONE ENCOUNTER
Reclast due 9/13/23. PA not needed for 2023 for Reclast.   Per [employer]- patient's benefits:  INDIVIDUAL DEDUCTIBLE: $3,000.00 PER SERVICE YEAR - $2,643.14 REMAINING  INDIVIDUAL OUT OF POCKET: $6,600.00 PER SERVICE YEAR - $2,579.01 REMAINING  INDIVIDUAL COINSURANCE: 35%    PHONE CALL to patient to schedule the Reclast. Scheduled her for 9/20/23. Advised to come well hydrated and premed with 2 Tylenol.

## 2023-09-15 NOTE — TELEPHONE ENCOUNTER
Advised patient efforts to get higher strength of Diclofenac gel failed. She received the letter as well. Informed her she could continue to use tot OTC options as needed. Pt understood.

## 2023-09-20 ENCOUNTER — NURSE ONLY (OUTPATIENT)
Dept: RHEUMATOLOGY | Age: 63
End: 2023-09-20
Payer: COMMERCIAL

## 2023-09-20 VITALS — DIASTOLIC BLOOD PRESSURE: 81 MMHG | TEMPERATURE: 98.2 F | SYSTOLIC BLOOD PRESSURE: 122 MMHG | HEART RATE: 96 BPM

## 2023-09-20 DIAGNOSIS — M81.0 POSTMENOPAUSAL OSTEOPOROSIS: Primary | ICD-10-CM

## 2023-09-20 PROCEDURE — 96365 THER/PROPH/DIAG IV INF INIT: CPT | Performed by: INTERNAL MEDICINE

## 2023-09-20 RX ORDER — ZOLEDRONIC ACID 5 MG/100ML
5 INJECTION, SOLUTION INTRAVENOUS ONCE
Status: COMPLETED | OUTPATIENT
Start: 2023-09-20 | End: 2023-09-20

## 2023-09-20 RX ADMIN — ZOLEDRONIC ACID 5 MG: 5 INJECTION, SOLUTION INTRAVENOUS at 14:10

## 2023-09-20 NOTE — PROGRESS NOTES
Shakila, [address], [address]   Office : [phone], Fax: [phone]       Reclast 5mg/100ml infused today over 30 minutes for safety. Infusion tolerated well without complications. Advised patient to continue taking Calcium and Vitamin D post infusion. Call with any problems or side effects. Infusion placed in left forearm. IV insertion time: 1405  Medication start time: 1410  Medication completion time: 1440    Patient discharged feeling fine and instructed to call the office with any post-infusion issues. Pre-infusion/injection questionairre for osteoporosis    1. Have you had or are you planning any dental work? NO    2. Are you taking your calcium and vitamin D? YES    3. When was your last osteoporosis treatment? 9/13/22    4. Have you had any recent fractures?  NO

## 2023-10-03 ENCOUNTER — HOSPITAL ENCOUNTER (OUTPATIENT)
Dept: MAMMOGRAPHY | Age: 63
Discharge: HOME | End: 2023-10-06
Attending: FAMILY MEDICINE
Payer: COMMERCIAL

## 2023-10-03 DIAGNOSIS — Z12.31 SCREENING MAMMOGRAM FOR HIGH-RISK PATIENT: ICD-10-CM

## 2023-10-03 PROCEDURE — 77063 BREAST TOMOSYNTHESIS BI: CPT

## 2023-10-30 DIAGNOSIS — M81.0 POSTMENOPAUSAL OSTEOPOROSIS: ICD-10-CM

## 2023-10-30 DIAGNOSIS — M05.9 SEROPOSITIVE RHEUMATOID ARTHRITIS (HCC): ICD-10-CM

## 2023-10-30 LAB
BASOPHILS # BLD: 0.1 K/UL (ref 0–0.2)
BASOPHILS NFR BLD: 1 % (ref 0–2)
DIFFERENTIAL METHOD BLD: NORMAL
EOSINOPHIL # BLD: 0.3 K/UL (ref 0–0.8)
EOSINOPHIL NFR BLD: 4 % (ref 0.5–7.8)
ERYTHROCYTE [DISTWIDTH] IN BLOOD BY AUTOMATED COUNT: 13.2 % (ref 11.9–14.6)
ERYTHROCYTE [SEDIMENTATION RATE] IN BLOOD: 9 MM/HR (ref 0–30)
HCT VFR BLD AUTO: 42.9 % (ref 35.8–46.3)
HGB BLD-MCNC: 13.5 G/DL (ref 11.7–15.4)
IMM GRANULOCYTES # BLD AUTO: 0 K/UL (ref 0–0.5)
IMM GRANULOCYTES NFR BLD AUTO: 0 % (ref 0–5)
LYMPHOCYTES # BLD: 1.6 K/UL (ref 0.5–4.6)
LYMPHOCYTES NFR BLD: 24 % (ref 13–44)
MCH RBC QN AUTO: 29 PG (ref 26.1–32.9)
MCHC RBC AUTO-ENTMCNC: 31.5 G/DL (ref 31.4–35)
MCV RBC AUTO: 92.3 FL (ref 82–102)
MONOCYTES # BLD: 0.8 K/UL (ref 0.1–1.3)
MONOCYTES NFR BLD: 12 % (ref 4–12)
NEUTS SEG # BLD: 3.9 K/UL (ref 1.7–8.2)
NEUTS SEG NFR BLD: 59 % (ref 43–78)
NRBC # BLD: 0 K/UL (ref 0–0.2)
PLATELET # BLD AUTO: 263 K/UL (ref 150–450)
PMV BLD AUTO: 10.1 FL (ref 9.4–12.3)
RBC # BLD AUTO: 4.65 M/UL (ref 4.05–5.2)
WBC # BLD AUTO: 6.6 K/UL (ref 4.3–11.1)

## 2023-10-31 LAB
25(OH)D3 SERPL-MCNC: 49.2 NG/ML (ref 30–100)
ALBUMIN SERPL-MCNC: 4.1 G/DL (ref 3.2–4.6)
ALBUMIN/GLOB SERPL: 1.2 (ref 0.4–1.6)
ALP SERPL-CCNC: 70 U/L (ref 50–136)
ALT SERPL-CCNC: 55 U/L (ref 12–65)
ANION GAP SERPL CALC-SCNC: 6 MMOL/L (ref 2–11)
AST SERPL-CCNC: 31 U/L (ref 15–37)
BILIRUB SERPL-MCNC: 0.4 MG/DL (ref 0.2–1.1)
BUN SERPL-MCNC: 20 MG/DL (ref 8–23)
CALCIUM SERPL-MCNC: 9.2 MG/DL (ref 8.3–10.4)
CHLORIDE SERPL-SCNC: 107 MMOL/L (ref 101–110)
CO2 SERPL-SCNC: 27 MMOL/L (ref 21–32)
CREAT SERPL-MCNC: 0.8 MG/DL (ref 0.6–1)
CRP SERPL-MCNC: <0.3 MG/DL (ref 0–0.9)
GLOBULIN SER CALC-MCNC: 3.3 G/DL (ref 2.8–4.5)
GLUCOSE SERPL-MCNC: 91 MG/DL (ref 65–100)
POTASSIUM SERPL-SCNC: 3.9 MMOL/L (ref 3.5–5.1)
PROT SERPL-MCNC: 7.4 G/DL (ref 6.3–8.2)
SODIUM SERPL-SCNC: 140 MMOL/L (ref 133–143)

## 2023-11-15 ENCOUNTER — OFFICE VISIT (OUTPATIENT)
Dept: RHEUMATOLOGY | Age: 63
End: 2023-11-15
Payer: COMMERCIAL

## 2023-11-15 VITALS
DIASTOLIC BLOOD PRESSURE: 84 MMHG | WEIGHT: 141 LBS | SYSTOLIC BLOOD PRESSURE: 138 MMHG | RESPIRATION RATE: 18 BRPM | HEIGHT: 60 IN | BODY MASS INDEX: 27.68 KG/M2 | HEART RATE: 84 BPM

## 2023-11-15 DIAGNOSIS — Z23 ENCOUNTER FOR IMMUNIZATION: ICD-10-CM

## 2023-11-15 DIAGNOSIS — M15.4 EROSIVE OSTEOARTHRITIS OF BOTH HANDS: ICD-10-CM

## 2023-11-15 DIAGNOSIS — Z79.899 LONG-TERM USE OF LEFLUNOMIDE THERAPY: Primary | ICD-10-CM

## 2023-11-15 DIAGNOSIS — M81.0 POSTMENOPAUSAL OSTEOPOROSIS: ICD-10-CM

## 2023-11-15 DIAGNOSIS — M05.9 SEROPOSITIVE RHEUMATOID ARTHRITIS (HCC): ICD-10-CM

## 2023-11-15 PROCEDURE — 99214 OFFICE O/P EST MOD 30 MIN: CPT | Performed by: INTERNAL MEDICINE

## 2023-11-15 RX ORDER — CHLORAL HYDRATE 500 MG
CAPSULE ORAL 2 TIMES DAILY
COMMUNITY

## 2023-11-15 RX ORDER — LEFLUNOMIDE 20 MG/1
20 TABLET ORAL DAILY
Qty: 90 TABLET | Refills: 0 | Status: SHIPPED | OUTPATIENT
Start: 2023-11-15

## 2023-11-15 ASSESSMENT — ROUTINE ASSESSMENT OF PATIENT INDEX DATA (RAPID3)
ON A SCALE OF ONE TO TEN, HOW MUCH OF A PROBLEM HAS UNUSUAL FATIGUE OR TIREDNESS BEEN FOR YOU OVER THE PAST WEEK?: 5
ON A SCALE OF ONE TO TEN, HOW MUCH PAIN HAVE YOU HAD BECAUSE OF YOUR CONDITION OVER THE PAST WEEK?: 5
WHEN YOU AWAKENED IN THE MORNING OVER THE LAST WEEK, PLEASE INDICATE THE AMOUNT OF TIME IT TAKES UNTIL YOU ARE AS LIMBER AS YOU WILL BE FOR THE DAY: < 10 MIN
ON A SCALE OF ONE TO TEN, HOW DIFFICULT WAS IT FOR YOU TO COMPLETE THE LISTED DAILY PHYSICAL TASKS OVER THE LAST WEEK: 0.1
ON A SCALE OF ONE TO TEN, CONSIDERING ALL THE WAYS IN WHICH ILLNESS AND HEALTH CONDITIONS MAY AFFECT YOU AT THIS TIME, PLEASE INDICATE BELOW HOW YOU ARE DOING:: 4

## 2023-11-15 NOTE — PROGRESS NOTES
(KLONOPIN) 0.5 MG tablet Take 1 tablet by mouth nightly as needed. fexofenadine (ALLEGRA) 180 MG tablet Take 1 tablet by mouth daily as needed      lisinopril (PRINIVIL;ZESTRIL) 5 MG tablet TAKE 1 TABLET BY MOUTH EVERY DAY      sertraline (ZOLOFT) 100 MG tablet Take 1 tablet by mouth daily      zoledronic acid (RECLAST) 5 MG/100ML SOLN Infuse 100 mLs intravenously once       No current facility-administered medications for this visit. PHYSICAL EXAM:  Vitals:    11/15/23 1059   BP: 138/84   Pulse: 84   Resp: 18         CONSTITUTIONAL:  The patient was in NAD.  ENT:  The OPC, MMM, lips/teeth/gums without abnormalities. NECK:  No masses or thyromegaly  CV:  RRR no r/m/g. Normal peripheral pulses  RESP:  CTA bilaterally. Normal respiratory effort. GI:  Abdomen soft, non tender, no hepatosplenomegaly  Skin:  No rashes, nodules, or other lesions. MUSCULOSKELETAL :  All joints including neck, back bilateral shoulders, elbows, wrists, MCP's, PIP's, DIP's, hips, knees, ankles, toes are within normal limits including normal gross examination, no synovitis, no tenderness, n'l ROM EXCEPT:   Prominent Heberden's nodules in bilateral #3 in particular but present within all  R elbow - reduced extension  T: 3  S: 0        CDAI  Date:  [facility] Pt-Global MD-Global Score:  11/2023 3 0 4.5  3  8/2023  2 0 4.5  3  5/2023  3 1 4.5  3  1/2023  11 0 5  5  21  6/2022  1 0 4.5  2  7.5 - arava  11/2021 9 0 5  4  18  8/2021  0 0 5.5  2  7.5  3/2021  0 0 4  3  7    Scoring:  Remission CDAI ? 2.8   Low Disease Activity CDAI > 2.8 and ? 10   Moderate Disease Activity CDAI > 10 and ? 22   High Disease Activity CDAI > 22   A CDAI reduction of 6.5 represents moderate improvement      ASSESSMENT AND PLAN:  Hector Campos is a 62 y.o. female that is here for evaluation of RA.  her problems include:    1. Seropositive rheumatoid arthritis ([address])  2. Cyclic citrullinated peptide (CCP) antibody positive  3.  Admission for therapeutic

## 2024-01-29 DIAGNOSIS — M05.9 SEROPOSITIVE RHEUMATOID ARTHRITIS (HCC): ICD-10-CM

## 2024-01-29 LAB
ALBUMIN SERPL-MCNC: 4 G/DL (ref 3.2–4.6)
ALBUMIN/GLOB SERPL: 1.1 (ref 0.4–1.6)
ALP SERPL-CCNC: 63 U/L (ref 50–136)
ALT SERPL-CCNC: 40 U/L (ref 12–65)
ANION GAP SERPL CALC-SCNC: 3 MMOL/L (ref 2–11)
AST SERPL-CCNC: 20 U/L (ref 15–37)
BASOPHILS # BLD: 0.1 K/UL (ref 0–0.2)
BASOPHILS NFR BLD: 1 % (ref 0–2)
BILIRUB SERPL-MCNC: 0.6 MG/DL (ref 0.2–1.1)
BUN SERPL-MCNC: 20 MG/DL (ref 8–23)
CALCIUM SERPL-MCNC: 9.1 MG/DL (ref 8.3–10.4)
CHLORIDE SERPL-SCNC: 109 MMOL/L (ref 103–113)
CO2 SERPL-SCNC: 26 MMOL/L (ref 21–32)
CREAT SERPL-MCNC: 0.9 MG/DL (ref 0.6–1)
CRP SERPL-MCNC: <0.3 MG/DL (ref 0–0.9)
DIFFERENTIAL METHOD BLD: NORMAL
EOSINOPHIL # BLD: 0.4 K/UL (ref 0–0.8)
EOSINOPHIL NFR BLD: 6 % (ref 0.5–7.8)
ERYTHROCYTE [DISTWIDTH] IN BLOOD BY AUTOMATED COUNT: 13.3 % (ref 11.9–14.6)
ERYTHROCYTE [SEDIMENTATION RATE] IN BLOOD: 12 MM/HR (ref 0–30)
GLOBULIN SER CALC-MCNC: 3.5 G/DL (ref 2.8–4.5)
GLUCOSE SERPL-MCNC: 102 MG/DL (ref 65–100)
HCT VFR BLD AUTO: 43.9 % (ref 35.8–46.3)
HGB BLD-MCNC: 14 G/DL (ref 11.7–15.4)
IMM GRANULOCYTES # BLD AUTO: 0 K/UL (ref 0–0.5)
IMM GRANULOCYTES NFR BLD AUTO: 0 % (ref 0–5)
LYMPHOCYTES # BLD: 2 K/UL (ref 0.5–4.6)
LYMPHOCYTES NFR BLD: 29 % (ref 13–44)
MCH RBC QN AUTO: 29.5 PG (ref 26.1–32.9)
MCHC RBC AUTO-ENTMCNC: 31.9 G/DL (ref 31.4–35)
MCV RBC AUTO: 92.4 FL (ref 82–102)
MONOCYTES # BLD: 0.6 K/UL (ref 0.1–1.3)
MONOCYTES NFR BLD: 9 % (ref 4–12)
NEUTS SEG # BLD: 3.8 K/UL (ref 1.7–8.2)
NEUTS SEG NFR BLD: 55 % (ref 43–78)
NRBC # BLD: 0 K/UL (ref 0–0.2)
PLATELET # BLD AUTO: 275 K/UL (ref 150–450)
PMV BLD AUTO: 10 FL (ref 9.4–12.3)
POTASSIUM SERPL-SCNC: 4 MMOL/L (ref 3.5–5.1)
PROT SERPL-MCNC: 7.5 G/DL (ref 6.3–8.2)
RBC # BLD AUTO: 4.75 M/UL (ref 4.05–5.2)
SODIUM SERPL-SCNC: 138 MMOL/L (ref 136–146)
WBC # BLD AUTO: 6.8 K/UL (ref 4.3–11.1)

## 2024-01-30 DIAGNOSIS — M05.9 SEROPOSITIVE RHEUMATOID ARTHRITIS (HCC): ICD-10-CM

## 2024-01-30 NOTE — TELEPHONE ENCOUNTER
Errol pt, OV 11/15/23 and appt 4/3/24. 1/29/24 labs. Rx for Arava 20 mg pended.     Component      Latest Ref Rng 1/29/2024   WBC      4.3 - 11.1 K/uL 6.8    RBC      4.05 - 5.2 M/uL 4.75    Hemoglobin Quant      11.7 - 15.4 g/dL 14.0    Hematocrit      35.8 - 46.3 % 43.9    MCV      82 - 102 FL 92.4    MCH      26.1 - 32.9 PG 29.5    MCHC      31.4 - 35.0 g/dL 31.9    RDW      11.9 - 14.6 % 13.3    Platelet Count      150 - 450 K/uL 275    MPV      9.4 - 12.3 FL 10.0    Nucleated Red Blood Cells      0.0 - 0.2 K/uL 0.00    Differential Type        AUTOMATED    Neutrophils %      43 - 78 % 55    Lymphocyte %      13 - 44 % 29    Monocytes %      4.0 - 12.0 % 9    Eosinophils %      0.5 - 7.8 % 6    Basophils %      0.0 - 2.0 % 1    Immature Granulocytes      0.0 - 5.0 % 0    Neutrophils Absolute      1.7 - 8.2 K/UL 3.8    Lymphocytes Absolute      0.5 - 4.6 K/UL 2.0    Monocytes Absolute      0.1 - 1.3 K/UL 0.6    Eosinophils Absolute      0.0 - 0.8 K/UL 0.4    Basophils Absolute      0.0 - 0.2 K/UL 0.1    Absolute Immature Granulocyte      0.0 - 0.5 K/UL 0.0    Sodium      136 - 146 mmol/L 138    Potassium      3.5 - 5.1 mmol/L 4.0    Chloride      103 - 113 mmol/L 109    CO2      21 - 32 mmol/L 26    Anion Gap      2 - 11 mmol/L 3    Glucose, Random      65 - 100 mg/dL 102 (H)    BUN,BUNPL      8 - 23 MG/DL 20    Creatinine      0.6 - 1.0 MG/DL 0.90    Est, Glom Filt Rate      >60 ml/min/1.73m2 >60    CALCIUM, SERUM, [id number]      8.3 - 10.4 MG/DL 9.1    BILIRUBIN TOTAL      0.2 - 1.1 MG/DL 0.6    ALT      12 - 65 U/L 40    AST      15 - 37 U/L 20    Alk Phos      50 - 136 U/L 63    Total Protein      6.3 - 8.2 g/dL 7.5    Albumin      3.2 - 4.6 g/dL 4.0    Globulin      2.8 - 4.5 g/dL 3.5    ALBUMIN/GLOBULIN RATIO      0.4 - 1.6   1.1    Sed Rate, Automated      0 - 30 mm/hr 12    CRP      0.0 - 0.9 mg/dL <0.3       Legend:  (H) High

## 2024-01-31 RX ORDER — LEFLUNOMIDE 20 MG/1
20 TABLET ORAL DAILY
Qty: 90 TABLET | Refills: 0 | Status: SHIPPED | OUTPATIENT
Start: 2024-01-31

## 2024-04-03 ENCOUNTER — OFFICE VISIT (OUTPATIENT)
Dept: RHEUMATOLOGY | Age: 64
End: 2024-04-03
Payer: COMMERCIAL

## 2024-04-03 VITALS
WEIGHT: 143 LBS | HEART RATE: 80 BPM | BODY MASS INDEX: 28.07 KG/M2 | HEIGHT: 60 IN | DIASTOLIC BLOOD PRESSURE: 78 MMHG | SYSTOLIC BLOOD PRESSURE: 122 MMHG | RESPIRATION RATE: 16 BRPM

## 2024-04-03 DIAGNOSIS — Z79.83 LONG TERM (CURRENT) USE OF BISPHOSPHONATES: ICD-10-CM

## 2024-04-03 DIAGNOSIS — M05.9 SEROPOSITIVE RHEUMATOID ARTHRITIS (HCC): ICD-10-CM

## 2024-04-03 DIAGNOSIS — M81.0 POSTMENOPAUSAL OSTEOPOROSIS: Primary | ICD-10-CM

## 2024-04-03 DIAGNOSIS — M81.0 POSTMENOPAUSAL OSTEOPOROSIS: ICD-10-CM

## 2024-04-03 DIAGNOSIS — Z79.899 LONG-TERM USE OF LEFLUNOMIDE THERAPY: ICD-10-CM

## 2024-04-03 LAB
BASOPHILS # BLD: 0.1 K/UL (ref 0–0.2)
BASOPHILS NFR BLD: 1 % (ref 0–2)
DIFFERENTIAL METHOD BLD: NORMAL
EOSINOPHIL # BLD: 0.3 K/UL (ref 0–0.8)
EOSINOPHIL NFR BLD: 4 % (ref 0.5–7.8)
ERYTHROCYTE [DISTWIDTH] IN BLOOD BY AUTOMATED COUNT: 13.4 % (ref 11.9–14.6)
ERYTHROCYTE [SEDIMENTATION RATE] IN BLOOD: 4 MM/HR (ref 0–30)
HCT VFR BLD AUTO: 45.1 % (ref 35.8–46.3)
HGB BLD-MCNC: 14.3 G/DL (ref 11.7–15.4)
IMM GRANULOCYTES # BLD AUTO: 0 K/UL (ref 0–0.5)
IMM GRANULOCYTES NFR BLD AUTO: 0 % (ref 0–5)
LYMPHOCYTES # BLD: 1.8 K/UL (ref 0.5–4.6)
LYMPHOCYTES NFR BLD: 23 % (ref 13–44)
MCH RBC QN AUTO: 29.3 PG (ref 26.1–32.9)
MCHC RBC AUTO-ENTMCNC: 31.7 G/DL (ref 31.4–35)
MCV RBC AUTO: 92.4 FL (ref 82–102)
MONOCYTES # BLD: 0.7 K/UL (ref 0.1–1.3)
MONOCYTES NFR BLD: 9 % (ref 4–12)
NEUTS SEG # BLD: 4.8 K/UL (ref 1.7–8.2)
NEUTS SEG NFR BLD: 63 % (ref 43–78)
NRBC # BLD: 0 K/UL (ref 0–0.2)
PLATELET # BLD AUTO: 282 K/UL (ref 150–450)
PMV BLD AUTO: 9.9 FL (ref 9.4–12.3)
RBC # BLD AUTO: 4.88 M/UL (ref 4.05–5.2)
WBC # BLD AUTO: 7.7 K/UL (ref 4.3–11.1)

## 2024-04-03 PROCEDURE — 99214 OFFICE O/P EST MOD 30 MIN: CPT | Performed by: INTERNAL MEDICINE

## 2024-04-03 RX ORDER — LEFLUNOMIDE 20 MG/1
20 TABLET ORAL DAILY
Qty: 90 TABLET | Refills: 0 | Status: SHIPPED | OUTPATIENT
Start: 2024-04-03

## 2024-04-03 ASSESSMENT — ROUTINE ASSESSMENT OF PATIENT INDEX DATA (RAPID3)
WHEN YOU AWAKENED IN THE MORNING OVER THE LAST WEEK, PLEASE INDICATE THE AMOUNT OF TIME IT TAKES UNTIL YOU ARE AS LIMBER AS YOU WILL BE FOR THE DAY: < 10 MIN
ON A SCALE OF ONE TO TEN, HOW DIFFICULT WAS IT FOR YOU TO COMPLETE THE LISTED DAILY PHYSICAL TASKS OVER THE LAST WEEK: 0.2
ON A SCALE OF ONE TO TEN, CONSIDERING ALL THE WAYS IN WHICH ILLNESS AND HEALTH CONDITIONS MAY AFFECT YOU AT THIS TIME, PLEASE INDICATE BELOW HOW YOU ARE DOING:: 4
ON A SCALE OF ONE TO TEN, HOW MUCH OF A PROBLEM HAS UNUSUAL FATIGUE OR TIREDNESS BEEN FOR YOU OVER THE PAST WEEK?: 5
ON A SCALE OF ONE TO TEN, HOW MUCH PAIN HAVE YOU HAD BECAUSE OF YOUR CONDITION OVER THE PAST WEEK?: 4

## 2024-04-03 NOTE — PROGRESS NOTES
4/3/24      SUBJECTIVE:  Ayana Tan is a 61 y.o. female that is here for follow-up of:     Seropositive -RF +CCP >250 Rheumatoid Arthritis -  Dx 2016   - Current therapies: , arava 20 mg (started ~2017)   - TB/Hepatitis - Nov 2020   - enbrel   - humira  Failed therapies:   - MTX: hair loss   - HCQ - retinal toxicity   - SSZ : GI upset   - xeljanz: GI upset    osteoporosis- #1 reclast Sept 2021. 2022, 2023     Hysterectomy 1999  L shoulder: RC tear w/arthroscopy    Prev worked  at [employer] - retired  ---  Last office visit: August 2023 with labs 1-.  Received Reclast infusion September 2023 #3 health side effects pt was maintained on arava 20 mg. Father passed in 2023.  Birth of grandson August 2023 [place]-she watches him during the day for her daughter.     No joint pain or swelling. Am stiffness <10 mins    Labs reviewed 4/24/23-CBC with differential, CMP, ESR, CRP    Completed new covid booster, flu shot, pneumonia   Adherent to therapy without side effects including freq infections, N/V, stomach pain    Am stiffness - < 10 mins but increased based on weather or if overactive  No consistent joint pain or swelling outside of more pain in DIPs -she is using hands more. 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  Asks if medication can be discontinued at any point in the future.    Follows with hand surgery and might have L #4 DIP fused in the future: on hold for now though. 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  No planned dental procedures. Consuming at least 1 serving of non-dairy milk on daily basis. No falls or fractures.     The most recent, and if available, past bone density study details are as follows.  Date L1-4 spine?BMD L1-4 spine T-score Femoral Neck BMD Femoral Neck T-score Lowest  T-score           -2.5       7/2021      R fem 0.694  L fem 0.714  -2.5  -2.3

## 2024-04-04 LAB
25(OH)D3 SERPL-MCNC: 42.8 NG/ML (ref 30–100)
ALBUMIN SERPL-MCNC: 4.2 G/DL (ref 3.2–4.6)
ALBUMIN/GLOB SERPL: 1.2 (ref 0.4–1.6)
ALP SERPL-CCNC: 82 U/L (ref 50–136)
ALT SERPL-CCNC: 55 U/L (ref 12–65)
ANION GAP SERPL CALC-SCNC: 4 MMOL/L (ref 2–11)
AST SERPL-CCNC: 24 U/L (ref 15–37)
BILIRUB SERPL-MCNC: 0.5 MG/DL (ref 0.2–1.1)
BUN SERPL-MCNC: 26 MG/DL (ref 8–23)
CALCIUM SERPL-MCNC: 9.7 MG/DL (ref 8.3–10.4)
CHLORIDE SERPL-SCNC: 103 MMOL/L (ref 103–113)
CO2 SERPL-SCNC: 28 MMOL/L (ref 21–32)
CREAT SERPL-MCNC: 0.8 MG/DL (ref 0.6–1)
CRP SERPL-MCNC: <0.3 MG/DL (ref 0–0.9)
GLOBULIN SER CALC-MCNC: 3.5 G/DL (ref 2.8–4.5)
GLUCOSE SERPL-MCNC: 91 MG/DL (ref 65–100)
POTASSIUM SERPL-SCNC: 4 MMOL/L (ref 3.5–5.1)
PROT SERPL-MCNC: 7.7 G/DL (ref 6.3–8.2)
SODIUM SERPL-SCNC: 135 MMOL/L (ref 136–146)

## 2024-06-28 DIAGNOSIS — M05.9 SEROPOSITIVE RHEUMATOID ARTHRITIS (HCC): ICD-10-CM

## 2024-07-16 DIAGNOSIS — Z79.899 LONG-TERM USE OF LEFLUNOMIDE THERAPY: ICD-10-CM

## 2024-07-16 DIAGNOSIS — M05.9 SEROPOSITIVE RHEUMATOID ARTHRITIS (HCC): ICD-10-CM

## 2024-07-16 LAB
ALBUMIN SERPL-MCNC: 4.1 G/DL (ref 3.2–4.6)
ALBUMIN/GLOB SERPL: 1.2 (ref 1–1.9)
ALP SERPL-CCNC: 76 U/L (ref 35–104)
ALT SERPL-CCNC: 35 U/L (ref 12–65)
ANION GAP SERPL CALC-SCNC: 13 MMOL/L (ref 9–18)
AST SERPL-CCNC: 26 U/L (ref 15–37)
BASOPHILS # BLD: 0.1 K/UL (ref 0–0.2)
BASOPHILS NFR BLD: 1 % (ref 0–2)
BILIRUB SERPL-MCNC: 0.3 MG/DL (ref 0–1.2)
BUN SERPL-MCNC: 14 MG/DL (ref 8–23)
CALCIUM SERPL-MCNC: 9.2 MG/DL (ref 8.8–10.2)
CHLORIDE SERPL-SCNC: 103 MMOL/L (ref 98–107)
CO2 SERPL-SCNC: 24 MMOL/L (ref 20–28)
CREAT SERPL-MCNC: 0.77 MG/DL (ref 0.6–1.1)
DIFFERENTIAL METHOD BLD: NORMAL
EOSINOPHIL # BLD: 0.4 K/UL (ref 0–0.8)
EOSINOPHIL NFR BLD: 6 % (ref 0.5–7.8)
ERYTHROCYTE [DISTWIDTH] IN BLOOD BY AUTOMATED COUNT: 13 % (ref 11.9–14.6)
ERYTHROCYTE [SEDIMENTATION RATE] IN BLOOD: 5 MM/HR (ref 0–30)
GLOBULIN SER CALC-MCNC: 3.3 G/DL (ref 2.3–3.5)
GLUCOSE SERPL-MCNC: 94 MG/DL (ref 70–99)
HCT VFR BLD AUTO: 43.6 % (ref 35.8–46.3)
HGB BLD-MCNC: 14 G/DL (ref 11.7–15.4)
IMM GRANULOCYTES # BLD AUTO: 0 K/UL (ref 0–0.5)
IMM GRANULOCYTES NFR BLD AUTO: 1 % (ref 0–5)
LYMPHOCYTES # BLD: 1.8 K/UL (ref 0.5–4.6)
LYMPHOCYTES NFR BLD: 27 % (ref 13–44)
MCH RBC QN AUTO: 29.2 PG (ref 26.1–32.9)
MCHC RBC AUTO-ENTMCNC: 32.1 G/DL (ref 31.4–35)
MCV RBC AUTO: 90.8 FL (ref 82–102)
MONOCYTES # BLD: 0.6 K/UL (ref 0.1–1.3)
MONOCYTES NFR BLD: 10 % (ref 4–12)
NEUTS SEG # BLD: 3.6 K/UL (ref 1.7–8.2)
NEUTS SEG NFR BLD: 55 % (ref 43–78)
NRBC # BLD: 0 K/UL (ref 0–0.2)
PLATELET # BLD AUTO: 258 K/UL (ref 150–450)
PMV BLD AUTO: 9.7 FL (ref 9.4–12.3)
POTASSIUM SERPL-SCNC: 4.4 MMOL/L (ref 3.5–5.1)
PROT SERPL-MCNC: 7.3 G/DL (ref 6.3–8.2)
RBC # BLD AUTO: 4.8 M/UL (ref 4.05–5.2)
SODIUM SERPL-SCNC: 140 MMOL/L (ref 136–145)
WBC # BLD AUTO: 6.5 K/UL (ref 4.3–11.1)

## 2024-07-18 LAB — CRP SERPL-MCNC: <1 MG/L (ref 0–10)

## 2024-09-23 DIAGNOSIS — M05.9 SEROPOSITIVE RHEUMATOID ARTHRITIS (HCC): ICD-10-CM

## 2024-09-23 RX ORDER — LEFLUNOMIDE 20 MG/1
20 TABLET ORAL DAILY
Qty: 90 TABLET | Refills: 0 | Status: SHIPPED | OUTPATIENT
Start: 2024-09-23

## 2024-09-23 RX ORDER — LEFLUNOMIDE 20 MG/1
20 TABLET ORAL DAILY
Qty: 90 TABLET | Refills: 0 | OUTPATIENT
Start: 2024-09-23

## 2025-07-01 ENCOUNTER — TRANSCRIBE ORDERS (OUTPATIENT)
Dept: SCHEDULING | Age: 65
End: 2025-07-01

## 2025-07-01 DIAGNOSIS — R59.9 ENLARGED LYMPH NODE: Primary | ICD-10-CM
